# Patient Record
Sex: FEMALE | Race: WHITE
[De-identification: names, ages, dates, MRNs, and addresses within clinical notes are randomized per-mention and may not be internally consistent; named-entity substitution may affect disease eponyms.]

---

## 2021-09-16 ENCOUNTER — DASHBOARD ENCOUNTERS (OUTPATIENT)
Age: 56
End: 2021-09-16

## 2021-09-16 ENCOUNTER — WEB ENCOUNTER (OUTPATIENT)
Dept: URBAN - METROPOLITAN AREA CLINIC 23 | Facility: CLINIC | Age: 56
End: 2021-09-16

## 2021-09-16 ENCOUNTER — OFFICE VISIT (OUTPATIENT)
Dept: URBAN - METROPOLITAN AREA CLINIC 23 | Facility: CLINIC | Age: 56
End: 2021-09-16
Payer: COMMERCIAL

## 2021-09-16 DIAGNOSIS — K62.5 RECTAL BLEEDING: ICD-10-CM

## 2021-09-16 PROBLEM — 12063002: Status: ACTIVE | Noted: 2021-09-16

## 2021-09-16 PROCEDURE — 99203 OFFICE O/P NEW LOW 30 MIN: CPT | Performed by: INTERNAL MEDICINE

## 2021-09-16 RX ORDER — LEVOTHYROXINE SODIUM 88 UG/1
1 TABLET IN THE MORNING ON AN EMPTY STOMACH TABLET ORAL ONCE A DAY
Status: ACTIVE | COMMUNITY

## 2021-09-16 NOTE — HPI-TODAY'S VISIT:
54 yo female presenting for evaluation for blood in the stool has seen blood since July- states that it is on the toilet paper- bright red blood.  seeing more blood than she has previously. has happened 5 times.  had a little anal pain at the time.  denies any change in bm with this.  denies abdominal pain.  denies any bloodwork with pcp this year.  - not told that there was a low bloodcount but also can't recall what she had done -no prior colonoscopy other than in her early 30s for abdominal discomfort. egd/colon were clear -denies any family history of colon cancer or polyps -denies any changes in weight

## 2021-09-17 LAB
HEMATOCRIT: 44.3
HEMOGLOBIN: 15.4

## 2021-09-21 ENCOUNTER — CLAIMS CREATED FROM THE CLAIM WINDOW (OUTPATIENT)
Dept: URBAN - METROPOLITAN AREA CLINIC 4 | Facility: CLINIC | Age: 56
End: 2021-09-21
Payer: COMMERCIAL

## 2021-09-21 ENCOUNTER — OFFICE VISIT (OUTPATIENT)
Dept: URBAN - METROPOLITAN AREA SURGERY CENTER 15 | Facility: SURGERY CENTER | Age: 56
End: 2021-09-21
Payer: COMMERCIAL

## 2021-09-21 DIAGNOSIS — D12.5 BENIGN NEOPLASM OF SIGMOID COLON: ICD-10-CM

## 2021-09-21 DIAGNOSIS — D12.2 BENIGN NEOPLASM OF ASCENDING COLON: ICD-10-CM

## 2021-09-21 DIAGNOSIS — D12.2 ADENOMA OF ASCENDING COLON: ICD-10-CM

## 2021-09-21 DIAGNOSIS — D12.5 ADENOMA OF SIGMOID COLON: ICD-10-CM

## 2021-09-21 DIAGNOSIS — K92.1 ACUTE MELENA: ICD-10-CM

## 2021-09-21 PROCEDURE — 45380 COLONOSCOPY AND BIOPSY: CPT | Performed by: INTERNAL MEDICINE

## 2021-09-21 PROCEDURE — G8907 PT DOC NO EVENTS ON DISCHARG: HCPCS | Performed by: INTERNAL MEDICINE

## 2021-09-21 PROCEDURE — 45385 COLONOSCOPY W/LESION REMOVAL: CPT | Performed by: INTERNAL MEDICINE

## 2021-09-21 PROCEDURE — 88305 TISSUE EXAM BY PATHOLOGIST: CPT | Performed by: PATHOLOGY

## 2021-09-21 PROCEDURE — 45381 COLONOSCOPY SUBMUCOUS NJX: CPT | Performed by: INTERNAL MEDICINE

## 2021-09-21 RX ORDER — LEVOTHYROXINE SODIUM 88 UG/1
1 TABLET IN THE MORNING ON AN EMPTY STOMACH TABLET ORAL ONCE A DAY
Status: ACTIVE | COMMUNITY

## 2021-09-30 ENCOUNTER — TELEPHONE ENCOUNTER (OUTPATIENT)
Dept: URBAN - METROPOLITAN AREA CLINIC 92 | Facility: CLINIC | Age: 56
End: 2021-09-30

## 2021-10-01 ENCOUNTER — TELEPHONE ENCOUNTER (OUTPATIENT)
Dept: URBAN - METROPOLITAN AREA CLINIC 49 | Facility: CLINIC | Age: 56
End: 2021-10-01

## 2023-09-05 NOTE — PHYSICAL EXAM CONSTITUTIONAL:
well developed, well nourished , in no acute distress , ambulating without difficulty , normal communication ability Topical Metronidazole Counseling: Metronidazole is a topical antibiotic medication. You may experience burning, stinging, redness, or allergic reactions.  Please call our office if you develop any problems from using this medication.

## 2024-10-01 ENCOUNTER — OFFICE VISIT (OUTPATIENT)
Dept: URBAN - METROPOLITAN AREA CLINIC 12 | Facility: CLINIC | Age: 59
End: 2024-10-01
Payer: COMMERCIAL

## 2024-10-01 VITALS
BODY MASS INDEX: 35.82 KG/M2 | TEMPERATURE: 98 F | DIASTOLIC BLOOD PRESSURE: 99 MMHG | SYSTOLIC BLOOD PRESSURE: 168 MMHG | HEIGHT: 64 IN | WEIGHT: 209.8 LBS | HEART RATE: 90 BPM

## 2024-10-01 DIAGNOSIS — K64.8 INTERNAL HEMORRHOID: ICD-10-CM

## 2024-10-01 DIAGNOSIS — K62.5 RECTAL BLEEDING: ICD-10-CM

## 2024-10-01 DIAGNOSIS — Z86.0101 HISTORY OF ADENOMATOUS POLYP OF COLON: ICD-10-CM

## 2024-10-01 PROBLEM — 90458007: Status: ACTIVE | Noted: 2024-10-01

## 2024-10-01 PROBLEM — 429047008: Status: ACTIVE | Noted: 2024-10-01

## 2024-10-01 PROCEDURE — 99204 OFFICE O/P NEW MOD 45 MIN: CPT

## 2024-10-01 RX ORDER — LEVOTHYROXINE SODIUM 88 UG/1
1 TABLET IN THE MORNING ON AN EMPTY STOMACH TABLET ORAL ONCE A DAY
Status: ACTIVE | COMMUNITY

## 2024-10-01 NOTE — HPI-TODAY'S VISIT:
Pt is a 57 yo female presents today for hematochezia and surveillance colonoscopy. Her last colon was in 2021 with Dr. Anders for hematochezia which revealed a 15mm tubulovillous adenoma in sigmoid, small TA in the ascending colon, diverticulosis, moderate sized non-bleeding internal hemorrhoids. Repeat in 3 years advised. She is due this year.   Pt states she's been asymptomatic for 3 years until recently. C/o recent onset of rectal bleeding 1 week ago, after consuming spicy chili. She noticed bright red blood in stool on Sunday, followed by a small red streak on Tuesday. Last night, she had mucous in stool with mild cramping that resolved after bowel movement. She denies abdominal pain but reports increased bowel frequency (3-4 times daily) for the past week, with normal consistency and color. She denies weight changes or appetite issues. Denies FH of CRC or IBD. Denies hemorrhoids flare up or rectal pain. No fever.chills. She has no known drug allergies and no cardiac, pulmonary, or renal issues. Denies trouble with anesthesia in the past.

## 2024-10-28 ENCOUNTER — OFFICE VISIT (OUTPATIENT)
Dept: URBAN - METROPOLITAN AREA CLINIC 23 | Facility: CLINIC | Age: 59
End: 2024-10-28

## 2024-10-29 ENCOUNTER — OFFICE VISIT (OUTPATIENT)
Dept: URBAN - METROPOLITAN AREA SURGERY CENTER 15 | Facility: SURGERY CENTER | Age: 59
End: 2024-10-29

## 2024-10-29 RX ORDER — LEVOTHYROXINE SODIUM 88 UG/1
1 TABLET IN THE MORNING ON AN EMPTY STOMACH TABLET ORAL ONCE A DAY
Status: ACTIVE | COMMUNITY

## 2024-11-12 ENCOUNTER — OFFICE VISIT (OUTPATIENT)
Dept: URBAN - METROPOLITAN AREA CLINIC 12 | Facility: CLINIC | Age: 59
End: 2024-11-12
Payer: COMMERCIAL

## 2024-11-12 VITALS
WEIGHT: 109 LBS | SYSTOLIC BLOOD PRESSURE: 130 MMHG | DIASTOLIC BLOOD PRESSURE: 81 MMHG | HEART RATE: 90 BPM | HEIGHT: 64 IN | TEMPERATURE: 97.3 F | BODY MASS INDEX: 18.61 KG/M2

## 2024-11-12 DIAGNOSIS — K57.30 DIVERTICULOSIS: ICD-10-CM

## 2024-11-12 DIAGNOSIS — I10 HYPERTENSION, UNSPECIFIED TYPE: ICD-10-CM

## 2024-11-12 DIAGNOSIS — K62.5 RECTAL BLEEDING: ICD-10-CM

## 2024-11-12 DIAGNOSIS — Z86.0101 HISTORY OF ADENOMATOUS POLYP OF COLON: ICD-10-CM

## 2024-11-12 PROBLEM — 397881000: Status: ACTIVE | Noted: 2024-11-12

## 2024-11-12 PROBLEM — 38341003: Status: ACTIVE | Noted: 2024-11-12

## 2024-11-12 PROCEDURE — 99214 OFFICE O/P EST MOD 30 MIN: CPT

## 2024-11-12 RX ORDER — LEVOTHYROXINE SODIUM 88 UG/1
1 TABLET IN THE MORNING ON AN EMPTY STOMACH TABLET ORAL ONCE A DAY
Status: ACTIVE | COMMUNITY

## 2024-11-12 NOTE — HPI-TODAY'S VISIT:
Pt is a 60 yo female with hx of a large tubulovillious adenoma presents today for a follow up after her recent colonoscopy. She was seen by me on 10/1/24 for retal bleeding. S/p colon on 10/29/24 revealing 8 small TA's and 1 benign polyp. Diverticulosis. Repeat in 1 year d/t suboptimal bowel prep and for surveillance. She expresses concern about waiting a year for the next colonoscopy due to the increasing number of polyps. Denies any changes in bowel habits or more episodes of rectal bleeding.   Last OV note from 10/1/24: Pt is a 59 yo female presents today for hematochezia and surveillance colonoscopy. Her last colon was in 2021 with Dr. Anders for hematochezia which revealed a 15mm tubulovillous adenoma in sigmoid, small TA in the ascending colon, diverticulosis, moderate sized non-bleeding internal hemorrhoids. Repeat in 3 years advised. She is due this year.   Pt states she's been asymptomatic for 3 years until recently. C/o recent onset of rectal bleeding 1 week ago, after consuming spicy chili. She noticed bright red blood in stool on Sunday, followed by a small red streak on Tuesday. Last night, she had mucous in stool with mild cramping that resolved after bowel movement. She denies abdominal pain but reports increased bowel frequency (3-4 times daily) for the past week, with normal consistency and color. She denies weight changes or appetite issues. Denies FH of CRC or IBD. Denies hemorrhoids flare up or rectal pain. No fever.chills. She has no known drug allergies and no cardiac, pulmonary, or renal issues. Denies trouble with anesthesia in the past.